# Patient Record
Sex: FEMALE | Race: WHITE | NOT HISPANIC OR LATINO | Employment: FULL TIME | URBAN - METROPOLITAN AREA
[De-identification: names, ages, dates, MRNs, and addresses within clinical notes are randomized per-mention and may not be internally consistent; named-entity substitution may affect disease eponyms.]

---

## 2024-03-14 ENCOUNTER — OFFICE VISIT (OUTPATIENT)
Dept: PRIMARY CARE | Facility: CLINIC | Age: 24
End: 2024-03-14
Payer: COMMERCIAL

## 2024-03-14 DIAGNOSIS — R10.9 ABDOMINAL PAIN, UNSPECIFIED ABDOMINAL LOCATION: ICD-10-CM

## 2024-03-14 DIAGNOSIS — Z00.00 ANNUAL PHYSICAL EXAM: Primary | ICD-10-CM

## 2024-03-14 LAB
APPEARANCE UR: CLEAR
BILIRUB UR QL STRIP: NEGATIVE
COLOR UR: YELLOW
GLUCOSE UR STRIP-MCNC: NEGATIVE MG/DL
HGB UR QL STRIP: NEGATIVE
KETONES UR STRIP-MCNC: NEGATIVE MG/DL
LEUKOCYTE ESTERASE UR QL STRIP: NEGATIVE
NITRITE UR QL STRIP: NEGATIVE
PH UR STRIP: 6.5 [PH]
PROT UR STRIP-MCNC: NEGATIVE MG/DL
SP GR UR STRIP.AUTO: 1.02
UROBILINOGEN UR STRIP-ACNC: 0.2 E.U./DL

## 2024-03-14 PROCEDURE — 93000 ELECTROCARDIOGRAM COMPLETE: CPT | Performed by: INTERNAL MEDICINE

## 2024-03-14 PROCEDURE — 80053 COMPREHEN METABOLIC PANEL: CPT | Performed by: INTERNAL MEDICINE

## 2024-03-14 PROCEDURE — 1036F TOBACCO NON-USER: CPT | Performed by: INTERNAL MEDICINE

## 2024-03-14 PROCEDURE — 80061 LIPID PANEL: CPT | Performed by: INTERNAL MEDICINE

## 2024-03-14 PROCEDURE — 99395 PREV VISIT EST AGE 18-39: CPT | Performed by: INTERNAL MEDICINE

## 2024-03-14 PROCEDURE — 84443 ASSAY THYROID STIM HORMONE: CPT | Performed by: INTERNAL MEDICINE

## 2024-03-14 PROCEDURE — 83036 HEMOGLOBIN GLYCOSYLATED A1C: CPT | Performed by: INTERNAL MEDICINE

## 2024-03-14 PROCEDURE — 81003 URINALYSIS AUTO W/O SCOPE: CPT | Performed by: INTERNAL MEDICINE

## 2024-03-14 PROCEDURE — 85025 COMPLETE CBC W/AUTO DIFF WBC: CPT | Performed by: INTERNAL MEDICINE

## 2024-03-14 RX ORDER — CITALOPRAM 10 MG/1
10 TABLET ORAL DAILY
COMMUNITY

## 2024-03-14 ASSESSMENT — ENCOUNTER SYMPTOMS
ABDOMINAL PAIN: 0
WHEEZING: 0
BACK PAIN: 0
PALPITATIONS: 0
DEPRESSION: 0
COUGH: 0
APPETITE CHANGE: 0
ACTIVITY CHANGE: 0
FATIGUE: 0
NAUSEA: 0
NERVOUS/ANXIOUS: 1
UNEXPECTED WEIGHT CHANGE: 0
ABDOMINAL DISTENTION: 1
SLEEP DISTURBANCE: 0
COLOR CHANGE: 0
LOSS OF SENSATION IN FEET: 0
TROUBLE SWALLOWING: 0
ARTHRALGIAS: 0
DYSURIA: 0
OCCASIONAL FEELINGS OF UNSTEADINESS: 0
SHORTNESS OF BREATH: 0
VOMITING: 0
CONSTIPATION: 0
DIARRHEA: 0

## 2024-03-14 NOTE — PROGRESS NOTES
Subjective    Ara Mena is a 24 y.o. female who presents to establish patient physician relationship.  Patient is here for Annual physical exam.  Has episodes of chest pain, L sided.  Started December January , daily.  Worse After meals .    HPI    This is a 24 years Old lady with medical history of appendectomy, IBS.  Patient is presented to establish patient physician relationship.  She is here for Annual physical exam.  Has been having chest pain episodes , since December, January, almost daily.  Worse after meals.  Has Allergies to pollen    Patient Had evaluation for abdominal pain, had laparoscopic   no evidence of endometriosis.  Better without BCP.  Followed by GYN Dr Charles , has incoming appointment  5/2023.  Due to GI appointment Dr Canseco.     Has Bloating episodes.  Following by GI.  Negative for celiac.    Family history   Mother  has Crohns.  Grandmother Breast CA 77.  Father healthy.    Review of Systems   Constitutional:  Negative for activity change, appetite change, fatigue and unexpected weight change.   HENT:  Negative for congestion, ear discharge, ear pain, nosebleeds and trouble swallowing.    Respiratory:  Negative for cough, shortness of breath and wheezing.    Cardiovascular:  Negative for palpitations and leg swelling.   Gastrointestinal:  Positive for abdominal distention. Negative for abdominal pain, constipation, diarrhea, nausea and vomiting.   Genitourinary:  Positive for menstrual problem. Negative for dysuria and pelvic pain.   Musculoskeletal:  Negative for arthralgias and back pain.   Skin:  Negative for color change and pallor.   Psychiatric/Behavioral:  Negative for sleep disturbance. The patient is nervous/anxious.        Objective        Vitals:    03/14/24 1024   BP: 100/68   Pulse: 64   Resp: 16   Temp: 36 °C (96.8 °F)        Physical Exam  Constitutional:       Appearance: Normal appearance.   HENT:      Head: Normocephalic and atraumatic.      Right Ear:  External ear normal.      Left Ear: External ear normal.      Nose: Nose normal.      Mouth/Throat:      Mouth: Mucous membranes are moist.   Eyes:      Extraocular Movements: Extraocular movements intact.      Conjunctiva/sclera: Conjunctivae normal.      Pupils: Pupils are equal, round, and reactive to light.   Cardiovascular:      Rate and Rhythm: Normal rate.      Pulses: Normal pulses.      Heart sounds: Normal heart sounds.   Pulmonary:      Effort: Pulmonary effort is normal.      Breath sounds: Normal breath sounds.   Abdominal:      General: Abdomen is flat. Bowel sounds are normal.      Palpations: Abdomen is soft.   Musculoskeletal:         General: Normal range of motion.      Cervical back: Normal range of motion and neck supple.   Skin:     General: Skin is warm and dry.   Neurological:      General: No focal deficit present.      Mental Status: She is alert.   Psychiatric:         Mood and Affect: Mood normal.         Behavior: Behavior normal.         Thought Content: Thought content normal.         Judgment: Judgment normal.       Diagnoses and all orders for this visit:  Annual physical exam (Primary)  -     CBC  -     Comprehensive Metabolic Panel  -     ECG 12 Lead  -     Hemoglobin A1C  -     Lipid Panel  -     POCT UA (Automated) docked device  -     Thyroid Stimulating Hormone  Abdominal pain, unspecified abdominal location  -     US abdomen complete; Future  Other orders  -     POCT URINALYSIS AUTOMATED       -Hyperlipidemia  Discussed with patient about cholesterol 219, LDL is 114.  Keep Mediterranean diet, exercise.    -Chest pain, L sided.  R/o esophagitis, GERD.  Related to meals.  Modify your diet.  Has incoming appointment  with GI.    Normal weight with BMI 19.64.  Keep ideal BMI ,less, than 25.       Karina Loja MD

## 2024-03-16 VITALS
HEIGHT: 65 IN | HEART RATE: 64 BPM | TEMPERATURE: 96.8 F | WEIGHT: 118 LBS | SYSTOLIC BLOOD PRESSURE: 100 MMHG | RESPIRATION RATE: 16 BRPM | BODY MASS INDEX: 19.66 KG/M2 | DIASTOLIC BLOOD PRESSURE: 68 MMHG

## 2024-03-25 ENCOUNTER — HOSPITAL ENCOUNTER (OUTPATIENT)
Dept: RADIOLOGY | Facility: HOSPITAL | Age: 24
Discharge: HOME | End: 2024-03-25
Payer: COMMERCIAL

## 2024-03-25 DIAGNOSIS — R10.9 ABDOMINAL PAIN, UNSPECIFIED ABDOMINAL LOCATION: ICD-10-CM

## 2024-03-25 PROCEDURE — 76700 US EXAM ABDOM COMPLETE: CPT

## 2024-03-25 PROCEDURE — 76700 US EXAM ABDOM COMPLETE: CPT | Performed by: RADIOLOGY

## 2024-04-04 ENCOUNTER — OFFICE VISIT (OUTPATIENT)
Dept: GASTROENTEROLOGY | Facility: CLINIC | Age: 24
End: 2024-04-04
Payer: COMMERCIAL

## 2024-04-04 VITALS — HEART RATE: 56 BPM | BODY MASS INDEX: 19.83 KG/M2 | OXYGEN SATURATION: 97 % | HEIGHT: 65 IN | WEIGHT: 119 LBS

## 2024-04-04 DIAGNOSIS — K21.9 GASTROESOPHAGEAL REFLUX DISEASE, UNSPECIFIED WHETHER ESOPHAGITIS PRESENT: Primary | ICD-10-CM

## 2024-04-04 DIAGNOSIS — K58.0 IRRITABLE BOWEL SYNDROME WITH DIARRHEA: ICD-10-CM

## 2024-04-04 PROCEDURE — 99214 OFFICE O/P EST MOD 30 MIN: CPT | Performed by: INTERNAL MEDICINE

## 2024-04-04 PROCEDURE — 1036F TOBACCO NON-USER: CPT | Performed by: INTERNAL MEDICINE

## 2024-04-04 RX ORDER — FAMOTIDINE 20 MG/1
20 TABLET, FILM COATED ORAL DAILY
Qty: 30 TABLET | Refills: 1 | Status: SHIPPED | OUTPATIENT
Start: 2024-04-04 | End: 2024-06-03

## 2024-07-01 ENCOUNTER — APPOINTMENT (OUTPATIENT)
Dept: GASTROENTEROLOGY | Facility: CLINIC | Age: 24
End: 2024-07-01
Payer: COMMERCIAL

## 2024-07-01 VITALS — WEIGHT: 113 LBS | HEART RATE: 75 BPM | HEIGHT: 65 IN | OXYGEN SATURATION: 98 % | BODY MASS INDEX: 18.83 KG/M2

## 2024-07-01 DIAGNOSIS — K59.00 CONSTIPATION, UNSPECIFIED CONSTIPATION TYPE: Primary | ICD-10-CM

## 2024-07-01 DIAGNOSIS — K58.2 IRRITABLE BOWEL SYNDROME WITH BOTH CONSTIPATION AND DIARRHEA: ICD-10-CM

## 2024-07-01 DIAGNOSIS — R14.0 BLOATING: ICD-10-CM

## 2024-07-01 PROCEDURE — 1036F TOBACCO NON-USER: CPT | Performed by: INTERNAL MEDICINE

## 2024-07-01 PROCEDURE — 99213 OFFICE O/P EST LOW 20 MIN: CPT | Performed by: INTERNAL MEDICINE

## 2024-07-01 RX ORDER — HYDROXYZINE PAMOATE 25 MG/1
25 CAPSULE ORAL EVERY 8 HOURS
COMMUNITY

## 2024-07-01 RX ORDER — NORGESTIMATE AND ETHINYL ESTRADIOL 0.25-0.035
1 KIT ORAL EVERY 24 HOURS
COMMUNITY

## 2024-07-01 RX ORDER — BUSPIRONE HYDROCHLORIDE 5 MG/1
1 TABLET ORAL
COMMUNITY
Start: 2023-10-03

## 2024-07-01 NOTE — PROGRESS NOTES
REASON FOR VISIT: Follow-up IBS    HPI:  Ara Mena is a 24 y.o. female with a past medical history of IBS and bloating being evaluated in the office for follow-up.  At last visit had been experiencing some intermittent diarrhea alternating with constipation.  Felt to be IBS and visceral hypersensitivity.  Placed on Xifaxan which helped somewhat.  Now having more constipation issues.  GERD and heartburn symptoms resolved with short-term use of Prevacid for about a week but has not had any recurrence of chest pains or burning.          PRIOR ENDOSCOPY    PAST MEDICAL HISTORY  Past Medical History:   Diagnosis Date    Other specified health status     No pertinent past medical history       PAST SURGICAL HISTORY  No past surgical history on file.    FAMILY HISTORY  No family history on file.    SOCIAL HISTORY  Social History     Tobacco Use    Smoking status: Never    Smokeless tobacco: Never   Substance Use Topics    Alcohol use: Never       REVIEW OF SYSTEMS  CONSTITUTIONAL: negative for fever, chills, fatigue, or unintentional weight loss,   HEENT: negative for icteric sclera, eye pain/redness, or changes in vision/hearing  RESPIRATORY: negative for cough, hemoptysis, wheezing, orthopnea, or dyspnea on exertion  CARDIOVASCULAR: negative for chest pain, palpitations, or syncope   GASTROINTESTINAL: as noted per HPI  GENITOURINARY: negative for dysuria, polyuria, incontinence, or hematuria  MUSCULOSKELETAL: negative for arthralgia, myalgia, or joint swelling/stiffness   INTEGUMENTARY/SKIN: negative jaundice, rash, or skin lesion  HEMATOLOGIC/LYMPHATIC: negative for prolonged bleeding, easy bruising, or swollen lymph nodes  ENDOCRINE: negative for cold/heat intolerance, polydipsia, polyuria, or goiter  NEUROLOGIC: negative for headaches, dizziness, tremor, or gait abnormality  PSYCHIATRIC: negative for anxiety, depression, personality changes, or sleep disturbances      A 10 point review of systems was completed  "and was otherwise negative.    ALLERGIES  Allergies   Allergen Reactions    Sulfamethoxazole-Trimethoprim Hives       MEDICATIONS  Current Outpatient Medications   Medication Sig Dispense Refill    busPIRone (Buspar) 5 mg tablet Take 1 tablet (5 mg) by mouth 3 times a day.      citalopram (CeleXA) 10 mg tablet Take 1 tablet (10 mg) by mouth once daily.      hydrOXYzine pamoate (VistariL) 25 mg capsule Take 1 capsule (25 mg) by mouth every 8 hours.      levonorgestrel/ethin.estradiol (LEVONORGESTREL-ETHINYL ESTRAD TD)       norgestimate-ethinyl estradioL (Sprintec, 28,) 0.25-35 mg-mcg tablet Take 1 tablet by mouth once every 24 hours.      famotidine (Pepcid) 20 mg tablet Take 1 tablet (20 mg) by mouth once daily. 30 tablet 1     No current facility-administered medications for this visit.       VITALS  Pulse 75   Ht 1.651 m (5' 5\")   Wt 51.3 kg (113 lb)   SpO2 98%   BMI 18.80 kg/m²      PHYSICAL EXAM  Alert and oriented in no acute distress    ASSESSMENT/ PLAN  Patient with history of mixed pain or IBS and abdominal bloating doing somewhat better since treatment with Xifaxan.  Having now more predominant constipation symptoms.  I suggested starting MiraLAX twice a week and titrating up or down depending on symptoms.  I discussed with her that bloating will likely improve with that as well.  GERD symptoms resolved and only needed about 7 days of Pepcid.  No further chest pain or burning.  She has no dysphagia symptoms.  She will see me back in the office as needed.        Signature: Lazarus Canseco MD    Date: 7/1/2024  Time: 2:24 PM  Follow-up IBS  "

## 2024-12-15 ENCOUNTER — OFFICE VISIT (OUTPATIENT)
Dept: URGENT CARE | Age: 24
End: 2024-12-15
Payer: COMMERCIAL

## 2024-12-15 VITALS
SYSTOLIC BLOOD PRESSURE: 102 MMHG | WEIGHT: 120 LBS | RESPIRATION RATE: 16 BRPM | DIASTOLIC BLOOD PRESSURE: 65 MMHG | TEMPERATURE: 97.9 F | BODY MASS INDEX: 19.99 KG/M2 | HEART RATE: 71 BPM | OXYGEN SATURATION: 98 % | HEIGHT: 65 IN

## 2024-12-15 DIAGNOSIS — J01.90 ACUTE NON-RECURRENT SINUSITIS, UNSPECIFIED LOCATION: Primary | ICD-10-CM

## 2024-12-15 DIAGNOSIS — R05.1 ACUTE COUGH: ICD-10-CM

## 2024-12-15 PROCEDURE — 3008F BODY MASS INDEX DOCD: CPT | Performed by: STUDENT IN AN ORGANIZED HEALTH CARE EDUCATION/TRAINING PROGRAM

## 2024-12-15 PROCEDURE — 1036F TOBACCO NON-USER: CPT | Performed by: STUDENT IN AN ORGANIZED HEALTH CARE EDUCATION/TRAINING PROGRAM

## 2024-12-15 PROCEDURE — 99203 OFFICE O/P NEW LOW 30 MIN: CPT | Performed by: STUDENT IN AN ORGANIZED HEALTH CARE EDUCATION/TRAINING PROGRAM

## 2024-12-15 PROCEDURE — 99070 SPECIAL SUPPLIES PHYS/QHP: CPT | Performed by: STUDENT IN AN ORGANIZED HEALTH CARE EDUCATION/TRAINING PROGRAM

## 2024-12-15 RX ORDER — BENZONATATE 200 MG/1
200 CAPSULE ORAL 3 TIMES DAILY PRN
Qty: 30 CAPSULE | Refills: 0 | Status: SHIPPED | OUTPATIENT
Start: 2024-12-15

## 2024-12-15 RX ORDER — AMOXICILLIN AND CLAVULANATE POTASSIUM 875; 125 MG/1; MG/1
1 TABLET, FILM COATED ORAL 2 TIMES DAILY
Qty: 14 TABLET | Refills: 0 | Status: SHIPPED | OUTPATIENT
Start: 2024-12-15 | End: 2024-12-22

## 2024-12-15 ASSESSMENT — ENCOUNTER SYMPTOMS: COUGH: 1

## 2024-12-15 NOTE — PATIENT INSTRUCTIONS
I recommend rest, fluids, mucinex D (with pseudoephedrine), claritin or zyrtec once daily, nasal saline rinses 2-3 times daily, flonase once daily, cool mist humidifier in bedroom at night     Please follow up with your primary provider or return to urgent care within one week if symptoms do not improve.  You may schedule an appointment online at [email or web address] or call [phone]. Go to the Emergency Department if symptoms significantly worsen or if you develop symptoms including but not limited to chest pain or shortness of breath.

## 2024-12-15 NOTE — PROGRESS NOTES
"Subjective   Patient ID: Ara Mena is a 24 y.o. female. They present today with a chief complaint of Cough (C/O cough and congestion for the last 12 days. ).    History of Present Illness  Pt presents with cough and nasal congestion, pnd x 12 days. She is using nyquil and dayquil without relief. No sick contacts. Otherwise healthy. Denies sore throat ear pain fever chills cp sob wheezing. Non smoker.       Cough  Associated symptoms include postnasal drip.       Past Medical History  Allergies as of 12/15/2024 - Reviewed 12/15/2024   Allergen Reaction Noted    Sulfamethoxazole-trimethoprim Hives 07/01/2024       (Not in a hospital admission)       Past Medical History:   Diagnosis Date    Other specified health status     No pertinent past medical history       History reviewed. No pertinent surgical history.     reports that she has never smoked. She has never used smokeless tobacco. She reports that she does not drink alcohol and does not use drugs.    Review of Systems  Review of Systems   HENT:  Positive for congestion and postnasal drip.    Respiratory:  Positive for cough.    All other systems reviewed and are negative.                                 Objective    Vitals:    12/15/24 1115 12/15/24 1116   BP: 98/61 102/65   BP Location: Left arm Left arm   Patient Position: Sitting Sitting   BP Cuff Size: Adult Adult   Pulse: 69 71   Resp: 16    Temp: 36.6 °C (97.9 °F)    TempSrc: Oral    SpO2: 98%    Weight: 54.4 kg (120 lb)    Height: 1.651 m (5' 5\")      No LMP recorded.    Physical Exam  Vitals and nursing note reviewed.   Constitutional:       General: She is not in acute distress.     Appearance: Normal appearance. She is not ill-appearing, toxic-appearing or diaphoretic.   HENT:      Head: Normocephalic and atraumatic.      Right Ear: Tympanic membrane, ear canal and external ear normal.      Left Ear: Tympanic membrane, ear canal and external ear normal.      Nose: Congestion present. No " rhinorrhea.      Right Nostril: No foreign body, epistaxis, septal hematoma or occlusion.      Left Nostril: No foreign body, epistaxis, septal hematoma or occlusion.      Right Turbinates: Enlarged and swollen. Not pale.      Left Turbinates: Enlarged and swollen. Not pale.      Right Sinus: Maxillary sinus tenderness present. No frontal sinus tenderness.      Left Sinus: Maxillary sinus tenderness present. No frontal sinus tenderness.      Mouth/Throat:      Lips: Pink.      Mouth: Mucous membranes are moist.      Dentition: Normal dentition.      Pharynx: Oropharynx is clear. Uvula midline. No pharyngeal swelling, oropharyngeal exudate, posterior oropharyngeal erythema, uvula swelling or postnasal drip.      Tonsils: No tonsillar exudate or tonsillar abscesses.   Cardiovascular:      Rate and Rhythm: Normal rate and regular rhythm.      Pulses: Normal pulses.      Heart sounds: No murmur heard.  Pulmonary:      Effort: Pulmonary effort is normal. No respiratory distress.      Breath sounds: No stridor. No wheezing, rhonchi or rales.   Neurological:      Mental Status: She is alert.         Procedures    Point of Care Test & Imaging Results from this visit  No results found for this visit on 12/15/24.   No results found.    Diagnostic study results (if any) were reviewed by Yenni Calvni PA-C.    Assessment/Plan   Allergies, medications, history, and pertinent labs/EKGs/Imaging reviewed by Yenni Calvin PA-C.     Medical Decision Making  Low concern for pneumonia at this time  Advised supportive care  Return or follow up with pcp as needed for worsening, new or non improving symptoms.     Orders and Diagnoses  Diagnoses and all orders for this visit:  Acute non-recurrent sinusitis, unspecified location  -     amoxicillin-pot clavulanate (Augmentin) 875-125 mg tablet; Take 1 tablet by mouth 2 times a day for 7 days.  Acute cough  -     benzonatate (Tessalon) 200 mg capsule; Take 1 capsule (200 mg) by mouth  3 times a day as needed for cough. Do not crush or chew.      Medical Admin Record      Patient disposition: Home    Electronically signed by Yenni Calvin PA-C  11:26 AM

## 2025-07-01 ENCOUNTER — APPOINTMENT (OUTPATIENT)
Dept: PRIMARY CARE | Facility: CLINIC | Age: 25
End: 2025-07-01
Payer: COMMERCIAL

## 2025-07-01 VITALS
TEMPERATURE: 98 F | HEIGHT: 65 IN | RESPIRATION RATE: 16 BRPM | SYSTOLIC BLOOD PRESSURE: 102 MMHG | WEIGHT: 118 LBS | BODY MASS INDEX: 19.66 KG/M2 | DIASTOLIC BLOOD PRESSURE: 62 MMHG | HEART RATE: 62 BPM

## 2025-07-01 DIAGNOSIS — Z00.00 ANNUAL PHYSICAL EXAM: Primary | ICD-10-CM

## 2025-07-01 DIAGNOSIS — F41.9 ANXIETY: ICD-10-CM

## 2025-07-01 DIAGNOSIS — N92.6 ABNORMAL MENSTRUAL PERIODS: ICD-10-CM

## 2025-07-01 PROCEDURE — 93000 ELECTROCARDIOGRAM COMPLETE: CPT | Performed by: INTERNAL MEDICINE

## 2025-07-01 PROCEDURE — 1036F TOBACCO NON-USER: CPT | Performed by: INTERNAL MEDICINE

## 2025-07-01 PROCEDURE — 99395 PREV VISIT EST AGE 18-39: CPT | Performed by: INTERNAL MEDICINE

## 2025-07-01 PROCEDURE — 83036 HEMOGLOBIN GLYCOSYLATED A1C: CPT | Performed by: INTERNAL MEDICINE

## 2025-07-01 PROCEDURE — 3008F BODY MASS INDEX DOCD: CPT | Performed by: INTERNAL MEDICINE

## 2025-07-01 PROCEDURE — 85025 COMPLETE CBC W/AUTO DIFF WBC: CPT | Performed by: INTERNAL MEDICINE

## 2025-07-01 RX ORDER — CITALOPRAM 10 MG/1
10 TABLET ORAL DAILY
Qty: 90 TABLET | Refills: 2 | Status: SHIPPED | OUTPATIENT
Start: 2025-07-01 | End: 2025-09-29

## 2025-07-01 RX ORDER — HYDROXYZINE PAMOATE 25 MG/1
CAPSULE ORAL
Qty: 30 CAPSULE | Refills: 3 | Status: SHIPPED | OUTPATIENT
Start: 2025-07-01 | End: 2025-07-01 | Stop reason: SDUPTHER

## 2025-07-01 RX ORDER — HYDROXYZINE PAMOATE 25 MG/1
CAPSULE ORAL
Qty: 30 CAPSULE | Refills: 0 | Status: SHIPPED | OUTPATIENT
Start: 2025-07-01

## 2025-07-01 ASSESSMENT — ENCOUNTER SYMPTOMS
DEPRESSION: 0
LOSS OF SENSATION IN FEET: 0
NERVOUS/ANXIOUS: 1
OCCASIONAL FEELINGS OF UNSTEADINESS: 0

## 2025-07-01 ASSESSMENT — PATIENT HEALTH QUESTIONNAIRE - PHQ9
1. LITTLE INTEREST OR PLEASURE IN DOING THINGS: NOT AT ALL
2. FEELING DOWN, DEPRESSED OR HOPELESS: NOT AT ALL
SUM OF ALL RESPONSES TO PHQ9 QUESTIONS 1 AND 2: 0

## 2025-07-01 ASSESSMENT — COLUMBIA-SUICIDE SEVERITY RATING SCALE - C-SSRS
6. HAVE YOU EVER DONE ANYTHING, STARTED TO DO ANYTHING, OR PREPARED TO DO ANYTHING TO END YOUR LIFE?: NO
2. HAVE YOU ACTUALLY HAD ANY THOUGHTS OF KILLING YOURSELF?: NO
1. IN THE PAST MONTH, HAVE YOU WISHED YOU WERE DEAD OR WISHED YOU COULD GO TO SLEEP AND NOT WAKE UP?: NO

## 2025-07-01 ASSESSMENT — PAIN SCALES - GENERAL: PAINLEVEL_OUTOF10: 0-NO PAIN

## 2025-07-01 NOTE — PROGRESS NOTES
"Subjective   Patient ID: Ara Mena is a 25 y.o. female who presents for a physical exam.    HPI     This is a 25 years Old lady with medical history of appendectomy, IBS.  Patient is presented for annual physical exam.     Has Allergies to pollen     Patient Had evaluation for abdominal pain, had laparoscopic   no evidence of endometriosis.  Off  BCP.  Followed by GYN Dr Charles .  Had  GI appointment  with Dr Canseco.     Has Bloating episodes.  Following by GI.  Negative for celiac.    Review of Systems   Genitourinary:  Positive for menstrual problem.   Psychiatric/Behavioral:  The patient is nervous/anxious.    All other systems reviewed and are negative.      Objective   /62   Pulse 62   Temp 36.7 °C (98 °F) (Temporal)   Resp 16   Ht 1.651 m (5' 5\")   Wt 53.5 kg (118 lb)   BMI 19.64 kg/m²     Physical Exam  Constitutional:       Appearance: Normal appearance.   HENT:      Head: Normocephalic and atraumatic.      Right Ear: External ear normal.      Left Ear: External ear normal.      Nose: Nose normal.      Mouth/Throat:      Mouth: Mucous membranes are moist.   Eyes:      Extraocular Movements: Extraocular movements intact.      Conjunctiva/sclera: Conjunctivae normal.      Pupils: Pupils are equal, round, and reactive to light.   Cardiovascular:      Rate and Rhythm: Normal rate.      Pulses: Normal pulses.      Heart sounds: Normal heart sounds.   Pulmonary:      Effort: Pulmonary effort is normal.      Breath sounds: Normal breath sounds.   Abdominal:      General: Abdomen is flat. Bowel sounds are normal.      Palpations: Abdomen is soft.   Musculoskeletal:         General: Normal range of motion.      Cervical back: Normal range of motion and neck supple.   Skin:     General: Skin is warm and dry.   Neurological:      General: No focal deficit present.      Mental Status: She is alert.   Psychiatric:         Mood and Affect: Mood normal.         Behavior: Behavior normal.         " Thought Content: Thought content normal.         Judgment: Judgment normal.         Assessment/Plan   Problem List Items Addressed This Visit    None  Visit Diagnoses         Codes      Annual physical exam    -  Primary Z00.00    Relevant Orders    CBC w/5 Part Differential, [place] [facility]    Comprehensive Metabolic Panel    ECG 12 Lead (Completed)    Hemoglobin A1C    Lipid Panel    Thyroid Stimulating Hormone      Anxiety     F41.9    Relevant Medications    citalopram (CeleXA) 10 mg tablet      Abnormal menstrual periods     N92.6    Relevant Orders    DHEA-Sulfate    FSH & LH    Progesterone    Thyroxine, Free    Triiodothyronine, Free    Anti-Thyroglobulin Antibody    Ferritin    Iron and TIBC             Abnormal menstrual periods.  Followed  by GYN.  Off BC .    -Hyperlipidemia   Repeat lipid panel.  Keep Mediterranean diet, exercise.     Normal weight with BMI 19.64.  Keep ideal BMI ,less, than 25.

## 2025-07-02 LAB
ALBUMIN SERPL BCP-MCNC: 4.3 G/DL (ref 3.4–5)
ALP SERPL-CCNC: 55 U/L (ref 45–117)
ALT SERPL W P-5'-P-CCNC: 22 U/L (ref 16–63)
ANION GAP SERPL CALC-SCNC: 12 MMOL/L (ref 10–20)
AST SERPL W P-5'-P-CCNC: 17 U/L (ref 15–37)
BASOPHILS # BLD AUTO: 0.02 X10*3/UL (ref 0.1–1.6)
BASOPHILS NFR BLD AUTO: 0.35 % (ref 0–0.3)
BILIRUB SERPL-MCNC: 0.4 MG/DL (ref 0.2–1)
BUN SERPL-MCNC: 11 MG/DL (ref 7–18)
CALCIUM SERPL-MCNC: 9 MG/DL (ref 8.5–10.1)
CHLORIDE SERPL-SCNC: 102 MMOL/L (ref 98–107)
CHOLEST SERPL-MCNC: 232 MG/DL (ref 0–199)
CHOLESTEROL/HDL RATIO: 2.7 (ref 4.2–7)
CO2 SERPL-SCNC: 25 MMOL/L (ref 21–32)
CREAT SERPL-MCNC: 0.61 MG/DL (ref 0.6–1.1)
DHEA-S SERPL-MCNC: 92 MCG/DL (ref 14–349)
EGFRCR SERPLBLD CKD-EPI 2021: >90 ML/MIN/1.73M*2
EOSINOPHIL # BLD AUTO: 0.17 X10*3/UL (ref 0.04–0.5)
EOSINOPHIL NFR BLD AUTO: 3.11 % (ref 0.7–7)
ERYTHROCYTE [DISTWIDTH] IN BLOOD BY AUTOMATED COUNT: 13.1 % (ref 11.5–14.5)
FERRITIN SERPL-MCNC: 6 NG/ML (ref 16–154)
FSH SERPL-ACNC: 9.7 MIU/ML
GLUCOSE SERPL-MCNC: 82 MG/DL (ref 74–100)
HBA1C MFR BLD: 4.7 %
HCT VFR BLD AUTO: 37.2 % (ref 36.6–46.6)
HDLC SERPL-MCNC: 85 MG/DL (ref 40–59)
HGB BLD-MCNC: 13.25 G/DL (ref 12–15.4)
IRON SATN MFR SERPL: 18 % (CALC) (ref 16–45)
IRON SERPL-MCNC: 67 MCG/DL (ref 40–190)
IS PATIENT FASTING: YES
LDLC SERPL DIRECT ASSAY-MCNC: 132 MG/DL (ref 0–100)
LH SERPL-ACNC: 5.6 MIU/ML
LYMPHOCYTES # BLD AUTO: 1.38 X10*3/UL (ref 0–6)
LYMPHOCYTES NFR BLD AUTO: 25.08 % (ref 20.5–51.1)
MCH RBC QN AUTO: 32.2 PG (ref 26–32)
MCHC RBC AUTO-ENTMCNC: 35.6 G/DL (ref 31–38)
MCV RBC AUTO: 90.2 FL (ref 80–96)
MONOCYTES # BLD AUTO: 0.36 X10*3/UL (ref 1.6–24.9)
MONOCYTES NFR BLD AUTO: 6.53 % (ref 1.7–9.3)
NEUTROPHILS # BLD AUTO: 3.57 X10*3/UL (ref 1.4–6.5)
NEUTROPHILS NFR BLD AUTO: 64.93 % (ref 42.2–75.2)
PLATELET # BLD AUTO: 304.9 X10*3/UL (ref 150–450)
PMV BLD AUTO: 9.11 FL (ref 7.8–11)
POTASSIUM SERPL-SCNC: 4 MMOL/L (ref 3.5–5.1)
PROGEST SERPL-MCNC: 0.6 NG/ML
PROT SERPL-MCNC: 6.8 G/DL (ref 6.4–8.2)
RBC # BLD AUTO: 4.12 X10*6/UL (ref 3.9–5.3)
SODIUM SERPL-SCNC: 135 MMOL/L (ref 136–145)
T3FREE SERPL-MCNC: 3 PG/ML (ref 2.3–4.2)
T4 FREE SERPL-MCNC: 1 NG/DL (ref 0.8–1.8)
THYROGLOB AB SERPL-ACNC: 2 IU/ML
TIBC SERPL-MCNC: 377 MCG/DL (CALC) (ref 250–450)
TRIGL SERPL-MCNC: 33 MG/DL
TSH SERPL-ACNC: 1.58 MIU/L (ref 0.44–3.98)
WBC # BLD AUTO: 5.5 X10*3/UL (ref 4.5–10.5)

## 2025-08-10 DIAGNOSIS — F41.9 ANXIETY: ICD-10-CM

## 2025-08-11 RX ORDER — HYDROXYZINE PAMOATE 25 MG/1
CAPSULE ORAL
Qty: 30 CAPSULE | Refills: 2 | Status: SHIPPED | OUTPATIENT
Start: 2025-08-11